# Patient Record
Sex: FEMALE | Race: OTHER | HISPANIC OR LATINO | URBAN - METROPOLITAN AREA
[De-identification: names, ages, dates, MRNs, and addresses within clinical notes are randomized per-mention and may not be internally consistent; named-entity substitution may affect disease eponyms.]

---

## 2019-05-24 ENCOUNTER — OUTPATIENT (OUTPATIENT)
Dept: OUTPATIENT SERVICES | Facility: HOSPITAL | Age: 39
LOS: 1 days | Discharge: HOME | End: 2019-05-24

## 2019-05-28 LAB — SURGICAL PATHOLOGY STUDY: SIGNIFICANT CHANGE UP

## 2020-07-29 ENCOUNTER — OUTPATIENT (OUTPATIENT)
Dept: OUTPATIENT SERVICES | Facility: HOSPITAL | Age: 40
LOS: 1 days | Discharge: HOME | End: 2020-07-29
Payer: COMMERCIAL

## 2020-07-29 PROCEDURE — 88305 TISSUE EXAM BY PATHOLOGIST: CPT | Mod: 26

## 2023-05-05 VITALS
HEIGHT: 60 IN | RESPIRATION RATE: 16 BRPM | TEMPERATURE: 97 F | OXYGEN SATURATION: 99 % | DIASTOLIC BLOOD PRESSURE: 82 MMHG | HEART RATE: 66 BPM | SYSTOLIC BLOOD PRESSURE: 119 MMHG | WEIGHT: 178.57 LBS

## 2023-05-08 ENCOUNTER — INPATIENT (INPATIENT)
Facility: HOSPITAL | Age: 43
LOS: 1 days | Discharge: HOME | DRG: 742 | End: 2023-05-10
Attending: OBSTETRICS & GYNECOLOGY | Admitting: OBSTETRICS & GYNECOLOGY
Payer: COMMERCIAL

## 2023-05-08 DIAGNOSIS — Z98.890 OTHER SPECIFIED POSTPROCEDURAL STATES: Chronic | ICD-10-CM

## 2023-05-08 DIAGNOSIS — Z98.51 TUBAL LIGATION STATUS: Chronic | ICD-10-CM

## 2023-05-08 DIAGNOSIS — J95.89 OTHER POSTPROCEDURAL COMPLICATIONS AND DISORDERS OF RESPIRATORY SYSTEM, NOT ELSEWHERE CLASSIFIED: ICD-10-CM

## 2023-05-08 LAB
BLD GP AB SCN SERPL QL: NEGATIVE — SIGNIFICANT CHANGE UP
GLUCOSE BLDC GLUCOMTR-MCNC: 88 MG/DL — SIGNIFICANT CHANGE UP (ref 70–99)
RH IG SCN BLD-IMP: POSITIVE — SIGNIFICANT CHANGE UP

## 2023-05-08 PROCEDURE — 88307 TISSUE EXAM BY PATHOLOGIST: CPT | Mod: 26

## 2023-05-08 PROCEDURE — 88305 TISSUE EXAM BY PATHOLOGIST: CPT | Mod: 26

## 2023-05-08 DEVICE — ARISTA 3GR
Type: IMPLANTABLE DEVICE | Site: BILATERAL | Status: NON-FUNCTIONAL
Removed: 2023-05-08

## 2023-05-08 RX ORDER — ONDANSETRON 8 MG/1
8 TABLET, FILM COATED ORAL EVERY 6 HOURS
Refills: 0 | Status: DISCONTINUED | OUTPATIENT
Start: 2023-05-08 | End: 2023-05-10

## 2023-05-08 RX ORDER — ACETAMINOPHEN 500 MG
1000 TABLET ORAL ONCE
Refills: 0 | Status: COMPLETED | OUTPATIENT
Start: 2023-05-08 | End: 2023-05-08

## 2023-05-08 RX ORDER — IBUPROFEN 200 MG
600 TABLET ORAL EVERY 6 HOURS
Refills: 0 | Status: DISCONTINUED | OUTPATIENT
Start: 2023-05-08 | End: 2023-05-10

## 2023-05-08 RX ORDER — HYDROMORPHONE HYDROCHLORIDE 2 MG/ML
0.5 INJECTION INTRAMUSCULAR; INTRAVENOUS; SUBCUTANEOUS
Refills: 0 | Status: DISCONTINUED | OUTPATIENT
Start: 2023-05-08 | End: 2023-05-10

## 2023-05-08 RX ORDER — GABAPENTIN 400 MG/1
300 CAPSULE ORAL ONCE
Refills: 0 | Status: COMPLETED | OUTPATIENT
Start: 2023-05-08 | End: 2023-05-08

## 2023-05-08 RX ORDER — ACETAMINOPHEN 500 MG
1000 TABLET ORAL EVERY 6 HOURS
Refills: 0 | Status: DISCONTINUED | OUTPATIENT
Start: 2023-05-08 | End: 2023-05-10

## 2023-05-08 RX ORDER — HEPARIN SODIUM 5000 [USP'U]/ML
5000 INJECTION INTRAVENOUS; SUBCUTANEOUS ONCE
Refills: 0 | Status: COMPLETED | OUTPATIENT
Start: 2023-05-08 | End: 2023-05-08

## 2023-05-08 RX ORDER — KETOROLAC TROMETHAMINE 30 MG/ML
30 SYRINGE (ML) INJECTION EVERY 6 HOURS
Refills: 0 | Status: DISCONTINUED | OUTPATIENT
Start: 2023-05-08 | End: 2023-05-09

## 2023-05-08 RX ORDER — ALBUTEROL 90 UG/1
2 AEROSOL, METERED ORAL EVERY 6 HOURS
Refills: 0 | Status: DISCONTINUED | OUTPATIENT
Start: 2023-05-08 | End: 2023-05-10

## 2023-05-08 RX ORDER — SIMETHICONE 80 MG/1
80 TABLET, CHEWABLE ORAL EVERY 6 HOURS
Refills: 0 | Status: DISCONTINUED | OUTPATIENT
Start: 2023-05-08 | End: 2023-05-10

## 2023-05-08 RX ORDER — OXYCODONE HYDROCHLORIDE 5 MG/1
5 TABLET ORAL EVERY 4 HOURS
Refills: 0 | Status: DISCONTINUED | OUTPATIENT
Start: 2023-05-08 | End: 2023-05-10

## 2023-05-08 RX ORDER — OXYCODONE HYDROCHLORIDE 5 MG/1
10 TABLET ORAL EVERY 6 HOURS
Refills: 0 | Status: DISCONTINUED | OUTPATIENT
Start: 2023-05-08 | End: 2023-05-10

## 2023-05-08 RX ORDER — CELECOXIB 200 MG/1
400 CAPSULE ORAL ONCE
Refills: 0 | Status: COMPLETED | OUTPATIENT
Start: 2023-05-08 | End: 2023-05-08

## 2023-05-08 RX ORDER — SODIUM CHLORIDE 9 MG/ML
1000 INJECTION, SOLUTION INTRAVENOUS
Refills: 0 | Status: DISCONTINUED | OUTPATIENT
Start: 2023-05-08 | End: 2023-05-09

## 2023-05-08 RX ADMIN — Medication 1000 MILLIGRAM(S): at 06:59

## 2023-05-08 RX ADMIN — CELECOXIB 400 MILLIGRAM(S): 200 CAPSULE ORAL at 07:17

## 2023-05-08 RX ADMIN — ALBUTEROL 2 PUFF(S): 90 AEROSOL, METERED ORAL at 13:21

## 2023-05-08 RX ADMIN — CELECOXIB 400 MILLIGRAM(S): 200 CAPSULE ORAL at 07:00

## 2023-05-08 RX ADMIN — Medication 1000 MILLIGRAM(S): at 17:40

## 2023-05-08 RX ADMIN — Medication 1000 MILLIGRAM(S): at 21:06

## 2023-05-08 RX ADMIN — Medication 30 MILLIGRAM(S): at 13:40

## 2023-05-08 RX ADMIN — Medication 30 MILLIGRAM(S): at 19:35

## 2023-05-08 RX ADMIN — HEPARIN SODIUM 5000 UNIT(S): 5000 INJECTION INTRAVENOUS; SUBCUTANEOUS at 07:00

## 2023-05-08 RX ADMIN — Medication 30 MILLIGRAM(S): at 13:20

## 2023-05-08 RX ADMIN — SIMETHICONE 80 MILLIGRAM(S): 80 TABLET, CHEWABLE ORAL at 19:04

## 2023-05-08 RX ADMIN — SIMETHICONE 80 MILLIGRAM(S): 80 TABLET, CHEWABLE ORAL at 13:20

## 2023-05-08 RX ADMIN — GABAPENTIN 300 MILLIGRAM(S): 400 CAPSULE ORAL at 07:00

## 2023-05-08 RX ADMIN — Medication 1000 MILLIGRAM(S): at 07:16

## 2023-05-08 RX ADMIN — Medication 1000 MILLIGRAM(S): at 22:06

## 2023-05-08 RX ADMIN — Medication 30 MILLIGRAM(S): at 19:04

## 2023-05-08 RX ADMIN — Medication 1000 MILLIGRAM(S): at 16:36

## 2023-05-08 NOTE — H&P ADULT - ASSESSMENT
43 year old G0 who presents for surgical management for pelvic pain and endometriosis.     NPO, IVF  ERAS protocol  Consented preop  Anesthesia preop  Starting Hb 13.5 , Cr 0.47    KL PGY4

## 2023-05-08 NOTE — BRIEF OPERATIVE NOTE - OPERATION/FINDINGS
Abdominal Chloraprep used. 7cm uterus on exam . Pfannenstiel entry. After fascial  layer, there was a densely adherent thick avascular layer that was fused to the rectus muscle. Small 7cm uterus; bilateral dilated fallopian tubes, normal ovaries. Careful lysis of adhesions. Supracervical hysterectomy, bilateral salpingectomy performed using ligasure impact, hemostasis achieved. Reverse cone performed. Cervical stump closed with 2.0 vicryl. Surgicel powder placed. Peritoneum closed. Fascia closed. Exparel injected. Subcutaneous tissue hemostatic and closed in 2 layers. Skin closed with monocryl. Count correct, patient cleaned and stable.

## 2023-05-08 NOTE — H&P ADULT - HISTORY OF PRESENT ILLNESS
DASHA WHYTE  43y  Female [id number]    HPI: Patient is a 43 year old G0 who presents for supracervical hysterectomy for pelvic pain and endometriosis. Reports no current issues.       Name of GYN Physician: Jonathon     POB:  G0     Pgyn: endometriosis; denies abnormal pap smears     PMH: denies    PSurgHx: abdominoplasty, tubal ligation     Meds: advil and celebrex     All: No Known Allergies    Soc: neg x 3    ROS: neg except as noted in HPI

## 2023-05-08 NOTE — BRIEF OPERATIVE NOTE - NSICDXBRIEFPROCEDURE_GEN_ALL_CORE_FT
PROCEDURES:  Hysterectomy, supracervical, abdominal, with bilateral salpingectomy 08-May-2023 10:47:27  Terry Conley

## 2023-05-08 NOTE — PRE-ANESTHESIA EVALUATION ADULT - NSANTHLABRESULTSFT_GEN_ALL_CORE
Outside chart/data reviewed prior to procedure/surgery  Please see paper chart  HCT 39.6    BUN 8  Cr 0.6

## 2023-05-09 LAB
HCT VFR BLD CALC: 32.6 % — LOW (ref 34.5–45)
HGB BLD-MCNC: 10.9 G/DL — LOW (ref 11.5–15.5)
MCHC RBC-ENTMCNC: 30.9 PG — SIGNIFICANT CHANGE UP (ref 27–34)
MCHC RBC-ENTMCNC: 33.4 GM/DL — SIGNIFICANT CHANGE UP (ref 32–36)
MCV RBC AUTO: 92.4 FL — SIGNIFICANT CHANGE UP (ref 80–100)
NRBC # BLD: 0 /100 WBCS — SIGNIFICANT CHANGE UP (ref 0–0)
PLATELET # BLD AUTO: 167 K/UL — SIGNIFICANT CHANGE UP (ref 150–400)
RBC # BLD: 3.53 M/UL — LOW (ref 3.8–5.2)
RBC # FLD: 12.7 % — SIGNIFICANT CHANGE UP (ref 10.3–14.5)
WBC # BLD: 8.69 K/UL — SIGNIFICANT CHANGE UP (ref 3.8–10.5)
WBC # FLD AUTO: 8.69 K/UL — SIGNIFICANT CHANGE UP (ref 3.8–10.5)

## 2023-05-09 RX ORDER — ACETAMINOPHEN 500 MG
2 TABLET ORAL
Qty: 0 | Refills: 0 | DISCHARGE
Start: 2023-05-09

## 2023-05-09 RX ORDER — IBUPROFEN 200 MG
1 TABLET ORAL
Qty: 0 | Refills: 0 | DISCHARGE
Start: 2023-05-09

## 2023-05-09 RX ORDER — ENOXAPARIN SODIUM 100 MG/ML
40 INJECTION SUBCUTANEOUS EVERY 24 HOURS
Refills: 0 | Status: DISCONTINUED | OUTPATIENT
Start: 2023-05-09 | End: 2023-05-10

## 2023-05-09 RX ADMIN — Medication 1000 MILLIGRAM(S): at 19:17

## 2023-05-09 RX ADMIN — Medication 1000 MILLIGRAM(S): at 18:10

## 2023-05-09 RX ADMIN — Medication 1000 MILLIGRAM(S): at 11:58

## 2023-05-09 RX ADMIN — SIMETHICONE 80 MILLIGRAM(S): 80 TABLET, CHEWABLE ORAL at 11:58

## 2023-05-09 RX ADMIN — Medication 1000 MILLIGRAM(S): at 06:08

## 2023-05-09 RX ADMIN — Medication 1000 MILLIGRAM(S): at 13:02

## 2023-05-09 RX ADMIN — ENOXAPARIN SODIUM 40 MILLIGRAM(S): 100 INJECTION SUBCUTANEOUS at 12:08

## 2023-05-09 RX ADMIN — Medication 30 MILLIGRAM(S): at 01:03

## 2023-05-09 RX ADMIN — SIMETHICONE 80 MILLIGRAM(S): 80 TABLET, CHEWABLE ORAL at 18:10

## 2023-05-09 RX ADMIN — Medication 30 MILLIGRAM(S): at 06:51

## 2023-05-09 RX ADMIN — Medication 1000 MILLIGRAM(S): at 07:08

## 2023-05-09 RX ADMIN — Medication 30 MILLIGRAM(S): at 07:51

## 2023-05-09 RX ADMIN — Medication 30 MILLIGRAM(S): at 00:03

## 2023-05-09 RX ADMIN — SIMETHICONE 80 MILLIGRAM(S): 80 TABLET, CHEWABLE ORAL at 06:08

## 2023-05-09 RX ADMIN — SIMETHICONE 80 MILLIGRAM(S): 80 TABLET, CHEWABLE ORAL at 00:03

## 2023-05-09 NOTE — DISCHARGE NOTE PROVIDER - NSDCMRMEDTOKEN_GEN_ALL_CORE_FT
acetaminophen 500 mg oral tablet: 2 tab(s) orally every 6 hours  ibuprofen 600 mg oral tablet: 1 tab(s) orally every 6 hours As needed Mild Pain (1 - 3)

## 2023-05-09 NOTE — DISCHARGE NOTE PROVIDER - CARE PROVIDER_API CALL
Karyn Holt)  Obstetrics and Gynecology  [address]  [place], [place] [address]  Phone: [phone]  Fax: [phone]  Follow Up Time:

## 2023-05-09 NOTE — DISCHARGE NOTE PROVIDER - HOSPITAL COURSE
42yo G0 s/p abdominal JAQUELINE, BS, JACQUELINE for pelvic pain, uterus 7cm. Uncomplicated hospital course, patient is stable and meeting all postOP milestones.   42yo G0 s/p abdominal JAQUELINE, BS, JACQUELINE for pelvic pain, uterus 7cm. Uncomplicated hospital course, patient is stable and meeting all post op milestones. Patient is hemodynamically stable for discharge.

## 2023-05-09 NOTE — DISCHARGE NOTE PROVIDER - NSDCCPTREATMENT_GEN_ALL_CORE_FT
PRINCIPAL PROCEDURE  Procedure: Hysterectomy, supracervical, abdominal, with bilateral salpingectomy  Findings and Treatment:

## 2023-05-09 NOTE — PROGRESS NOTE ADULT - ASSESSMENT
A/P: 43y POD#1 s/p abdominal supracervical hysterectomy, bilateral salpingectomy, JACQUELINE. Patient doing well postoperatively     Neuro: Pain well controlled, continue on PO pain medications  CV: hemodynamically stable, monitor vitals  Pulm: saturating well on room air, albuterol PRN for SOB, encourage IS.   GI: tolerating regular diet, SLIV  : remove villalpando, TOV by 15:00  Heme: AM Hb 10.9, lovenox started, SCDs as well  ID: afebrile  Dispo: continue routine post-op care    Terry Conley PGY4

## 2023-05-09 NOTE — DISCHARGE NOTE PROVIDER - NSDCCPCAREPLAN_GEN_ALL_CORE_FT
PRINCIPAL DISCHARGE DIAGNOSIS  Diagnosis: S/P abdominal supracervical subtotal hysterectomy  Assessment and Plan of Treatment:

## 2023-05-09 NOTE — DISCHARGE NOTE PROVIDER - NSDCFUADDINST_GEN_ALL_CORE_FT
- Nothing in vagina - no intercourse, tampons, or douching until cleared by your doctor.   - Avoid swimming, tub baths, and heavy lifting until cleared by your doctor.   - Showering is ok.   - Continue oral pain medications as needed for pain.  Take tylenol 1000 mg every 6 hours and Motrin every 6 hours. Alternate between them so every 3 hours you take a medication.   - Follow up in office in 1-2 weeks for your postoperative visit.    - Call the office sooner if you develop any fever, heavy bleeding, or severe pain.  Go to the closest emergency room for any of these symptoms if you are not able to contact your doctor.

## 2023-05-10 VITALS
RESPIRATION RATE: 17 BRPM | TEMPERATURE: 98 F | HEART RATE: 71 BPM | DIASTOLIC BLOOD PRESSURE: 65 MMHG | OXYGEN SATURATION: 98 % | SYSTOLIC BLOOD PRESSURE: 111 MMHG

## 2023-05-10 PROCEDURE — C1889: CPT

## 2023-05-10 PROCEDURE — 94640 AIRWAY INHALATION TREATMENT: CPT

## 2023-05-10 PROCEDURE — 86900 BLOOD TYPING SEROLOGIC ABO: CPT

## 2023-05-10 PROCEDURE — 86901 BLOOD TYPING SEROLOGIC RH(D): CPT

## 2023-05-10 PROCEDURE — 88305 TISSUE EXAM BY PATHOLOGIST: CPT

## 2023-05-10 PROCEDURE — 88307 TISSUE EXAM BY PATHOLOGIST: CPT

## 2023-05-10 PROCEDURE — 85027 COMPLETE CBC AUTOMATED: CPT

## 2023-05-10 PROCEDURE — 86850 RBC ANTIBODY SCREEN: CPT

## 2023-05-10 PROCEDURE — 82962 GLUCOSE BLOOD TEST: CPT

## 2023-05-10 PROCEDURE — 36415 COLL VENOUS BLD VENIPUNCTURE: CPT

## 2023-05-10 RX ORDER — IRON SUCROSE 20 MG/ML
200 INJECTION, SOLUTION INTRAVENOUS ONCE
Refills: 0 | Status: COMPLETED | OUTPATIENT
Start: 2023-05-10 | End: 2023-05-10

## 2023-05-10 RX ORDER — OXYCODONE HYDROCHLORIDE 5 MG/1
1 TABLET ORAL
Qty: 20 | Refills: 0
Start: 2023-05-10

## 2023-05-10 RX ADMIN — SIMETHICONE 80 MILLIGRAM(S): 80 TABLET, CHEWABLE ORAL at 05:42

## 2023-05-10 RX ADMIN — Medication 1000 MILLIGRAM(S): at 06:42

## 2023-05-10 RX ADMIN — Medication 1000 MILLIGRAM(S): at 05:42

## 2023-05-10 RX ADMIN — SIMETHICONE 80 MILLIGRAM(S): 80 TABLET, CHEWABLE ORAL at 00:06

## 2023-05-10 RX ADMIN — Medication 1000 MILLIGRAM(S): at 00:06

## 2023-05-10 RX ADMIN — OXYCODONE HYDROCHLORIDE 5 MILLIGRAM(S): 5 TABLET ORAL at 11:51

## 2023-05-10 RX ADMIN — Medication 1000 MILLIGRAM(S): at 01:06

## 2023-05-10 RX ADMIN — IRON SUCROSE 110 MILLIGRAM(S): 20 INJECTION, SOLUTION INTRAVENOUS at 10:59

## 2023-05-10 RX ADMIN — Medication 1 APPLICATION(S): at 10:52

## 2023-05-10 RX ADMIN — SIMETHICONE 80 MILLIGRAM(S): 80 TABLET, CHEWABLE ORAL at 10:52

## 2023-05-10 RX ADMIN — Medication 1000 MILLIGRAM(S): at 10:51

## 2023-05-10 RX ADMIN — OXYCODONE HYDROCHLORIDE 5 MILLIGRAM(S): 5 TABLET ORAL at 10:51

## 2023-05-10 RX ADMIN — ENOXAPARIN SODIUM 40 MILLIGRAM(S): 100 INJECTION SUBCUTANEOUS at 05:42

## 2023-05-10 NOTE — DISCHARGE NOTE NURSING/CASE MANAGEMENT/SOCIAL WORK - PATIENT PORTAL LINK FT
You can access the FollowMyHealth Patient Portal offered by [facility] by registering at the following website: [email or web address]. By joining [email or web address] FollowMyHealth portal, you will also be able to view your health information using other applications (apps) compatible with our system.

## 2023-05-10 NOTE — PROGRESS NOTE ADULT - ASSESSMENT
A/P: 43y POD#2 s/p abdominal supracervical hysterectomy, bilateral salpingectomy, lysis of adhesions doing well.       Neuro: Pain well controlled, continue on PO pain medications  CV: hemodynamically stable, monitor vitals  Pulm: saturating well on room air, encourage oob/amb  GI: tolerating regular diet, SLIV  : voiding spontaneously, urine output adequate  Heme: SCDs and lovenox for DVT PPx   ID: afebrile  Dispo: continue routine post-op care    Terry Conley PGY4

## 2023-05-10 NOTE — PROGRESS NOTE ADULT - SUBJECTIVE AND OBJECTIVE BOX
GYN Progress Note  POD#2    Patient seen and examined at bedside.  No acute events overnight. No acute complaints.  Pain well controlled.  Patient is ambulating and tolerating regular diet; passing flatus.   Patient is voiding spontaneously.   Denies CP, SOB, N/V, fevers, and chills, heavy vaginal bleeding .    Vital Signs Last 24 Hours  T(C): 36.9 (05-10-23 @ 05:11), Max: 37 (05-09-23 @ 06:01)  HR: 82 (05-10-23 @ 05:11) (82 - 97)  BP: 105/72 (05-10-23 @ 05:11) (101/68 - 134/89)  RR: 15 (05-10-23 @ 05:11) (15 - 18)  SpO2: 97% (05-10-23 @ 05:11) (92% - 98%)    I&O's Summary    08 May 2023 07:01  -  09 May 2023 07:00  --------------------------------------------------------  IN: 1120 mL / OUT: 2880 mL / NET: -1760 mL    09 May 2023 07:01  -  10 May 2023 05:50  --------------------------------------------------------  IN: 960 mL / OUT: 1400 mL / NET: -440 mL        Physical Exam:  General: NAD  CV: RR, S1, S2  Lungs: CTA b/l, good air flow b/l   Abdomen: Soft, appropriately tender to palpation, softly distended, present bowel sounds in all 4 quadrants   Incision: low transverse incision c/d/i, dressing removed   : Scant bleeding on pad  Ext: warm and well perfused    Labs:                        10.9   8.69  )-----------( 167      ( 09 May 2023 05:11 )             32.6   baso x      eos x      imm gran x      lymph x      mono x      poly x          MEDICATIONS  (STANDING):  acetaminophen     Tablet .. 1000 milliGRAM(s) Oral every 6 hours  enoxaparin Injectable 40 milliGRAM(s) SubCutaneous every 24 hours  simethicone 80 milliGRAM(s) Chew every 6 hours    MEDICATIONS  (PRN):  albuterol    90 MICROgram(s) HFA Inhaler 2 Puff(s) Inhalation every 6 hours PRN Bronchospasm  HYDROmorphone  Injectable 0.5 milliGRAM(s) IV Push every 15 minutes PRN Severe Pain (7 - 10)  ibuprofen  Tablet. 600 milliGRAM(s) Oral every 6 hours PRN Mild Pain (1 - 3)  ondansetron Injectable 8 milliGRAM(s) IV Push every 6 hours PRN Nausea and/or Vomiting  oxyCODONE    IR 5 milliGRAM(s) Oral every 4 hours PRN Moderate Pain (4 - 6)  oxyCODONE    IR 10 milliGRAM(s) Oral every 6 hours PRN Severe Pain (7 - 10)    
GYN POC    Pt seen and examined at bedside due to nursing report of chest tightness and shortness of breath. Pt reports that since leaving the PACU, she has had trouble taking deep breaths and feels it get worse when trying to speak or take deep breaths. Reports that the tightness is coming in waves.   Denies radiation of pain/tightness down her arm. Denies dizziness, palpitations, sharp abdominal/pelvic pain. Denies nausea/vomiting. Denies hx of asthma, or additional cardio/pulm issues.     T(F): 98 (05-08-23 @ 12:00), Max: 98 (05-08-23 @ 12:00)  HR: 103 (05-08-23 @ 12:00) (83 - 103)  BP: 125/79 (05-08-23 @ 12:00) (103/57 - 125/79)  RR: 19 (05-08-23 @ 12:00) (17 - 20)  SpO2: 97% (05-08-23 @ 12:00) (95% - 100%)  Wt(kg): --    05-08 @ 07:01  -  05-08 @ 12:47  --------------------------------------------------------  IN: 375 mL / OUT: 30 mL / NET: 345 mL      acetaminophen     Tablet .. 1000 milliGRAM(s) Oral every 6 hours  albuterol    90 MICROgram(s) HFA Inhaler 2 Puff(s) Inhalation every 6 hours PRN Bronchospasm  HYDROmorphone  Injectable 0.5 milliGRAM(s) IV Push every 15 minutes PRN Severe Pain (7 - 10)  ibuprofen  Tablet. 600 milliGRAM(s) Oral every 6 hours PRN Mild Pain (1 - 3)  ketorolac   Injectable 30 milliGRAM(s) IV Push every 6 hours  lactated ringers. 1000 milliLiter(s) IV Continuous <Continuous>  ondansetron Injectable 8 milliGRAM(s) IV Push every 6 hours PRN Nausea and/or Vomiting  oxyCODONE    IR 5 milliGRAM(s) Oral every 4 hours PRN Moderate Pain (4 - 6)  oxyCODONE    IR 10 milliGRAM(s) Oral every 6 hours PRN Severe Pain (7 - 10)  simethicone 80 milliGRAM(s) Chew every 6 hours      Physical exam:  Constitutional: Increased work of breathing noted while speaking  Pulmonary: clear to auscultation bilaterally.   Cardiovascular: regular rate and rhythm  Abdomen: incision site clean, dry and intact. Soft, mildly tender, nondistended  Extremities: no lower extremity edema, or calve tenderness. SCDs in place    A: 43 year old w/ hx of abdominoplasty (no additional medical hx) s/p abdominal JAQUELINE, BS, JACQUELINE for menorrhagia, uncomplicated. EBL 175ccs. Pt endorsing SOB w/ deep breaths and chest tightness while speaking/breathing deeply. O2% 91% on RA. 98% on 2L O2 on NC. . /79. Lungs clear on exam w/ increased WOB while speaking.     Plan: SOB w/ deep breaths and improvement in O2 saturation w/ O2 on NC in the setting of immediate post op state, consistent w/ bronchospasms.   Neuro: Toradol and Tylenol standing, Oxycodone prn, Dilaudid for breakthrough  Cardio: vital signs stable, continue to monitor per protocol. EKG due to SOB w/ deep breaths and increased work of breathing. Consider troponins if no improvement in SOB w/ bronchodilator  Pulm: Albuterol ordered for post op bronchospasms. incentive spirometer at least 10 times per hour while awake. Continue 2L NC until symptoms improve.   GI:   Reglan, Zofran prn, Simethicone, Protonix   : Couch catheter   Follow up labs   DVT ppx: SCDs   Activity: As tolerated  
GYN Progress Note  POD#1    Patient seen and examined at bedside.  No acute events overnight. No acute complaints.  Pain well controlled.  Patient has not ambulated yet; she is tolerating a regular diet.   Patient is passing flatus.    Couch is still in place with adequate urine output.   Denies CP, SOB, N/V, fevers, and chills.    Vital Signs Last 24 Hours  T(C): 37 (05-09-23 @ 06:01), Max: 37.5 (05-09-23 @ 00:08)  HR: 84 (05-09-23 @ 06:01) (83 - 103)  BP: 101/71 (05-09-23 @ 06:01) (99/67 - 125/79)  RR: 18 (05-09-23 @ 06:01) (15 - 20)  SpO2: 92% (05-09-23 @ 06:01) (92% - 100%)    I&O's Summary    08 May 2023 07:01  -  09 May 2023 06:48  --------------------------------------------------------  IN: 1120 mL / OUT: 2880 mL / NET: -1760 mL        Physical Exam:  General: NAD  CV: RR, S1, S2  Lungs: CTA b/l, good air flow b/l   Abdomen: Soft, appropriately tender to palpation, softly distended, present bowel sounds in all 4 quadrants   Incision: pressure dressing in place   : no bleeding on pad  Ext: warm and well perfused    Labs:                        10.9   8.69  )-----------( 167      ( 09 May 2023 05:11 )             32.6   baso x      eos x      imm gran x      lymph x      mono x      poly x          MEDICATIONS  (STANDING):  acetaminophen     Tablet .. 1000 milliGRAM(s) Oral every 6 hours  ketorolac   Injectable 30 milliGRAM(s) IV Push every 6 hours  simethicone 80 milliGRAM(s) Chew every 6 hours    MEDICATIONS  (PRN):  albuterol    90 MICROgram(s) HFA Inhaler 2 Puff(s) Inhalation every 6 hours PRN Bronchospasm  HYDROmorphone  Injectable 0.5 milliGRAM(s) IV Push every 15 minutes PRN Severe Pain (7 - 10)  ibuprofen  Tablet. 600 milliGRAM(s) Oral every 6 hours PRN Mild Pain (1 - 3)  ondansetron Injectable 8 milliGRAM(s) IV Push every 6 hours PRN Nausea and/or Vomiting  oxyCODONE    IR 5 milliGRAM(s) Oral every 4 hours PRN Moderate Pain (4 - 6)  oxyCODONE    IR 10 milliGRAM(s) Oral every 6 hours PRN Severe Pain (7 - 10)

## 2023-05-10 NOTE — DISCHARGE NOTE NURSING/CASE MANAGEMENT/SOCIAL WORK - NSDCPEFALRISK_GEN_ALL_CORE
For information on Fall & Injury Prevention, visit: [email or web address] OR  [email or web address] OR  https://www.cdc.gov/steadi/patient.html

## 2023-05-23 LAB — SURGICAL PATHOLOGY STUDY: SIGNIFICANT CHANGE UP

## 2023-05-24 DIAGNOSIS — N80.9 ENDOMETRIOSIS, UNSPECIFIED: ICD-10-CM

## 2023-05-24 DIAGNOSIS — J98.01 ACUTE BRONCHOSPASM: ICD-10-CM

## 2023-05-24 DIAGNOSIS — G43.909 MIGRAINE, UNSPECIFIED, NOT INTRACTABLE, WITHOUT STATUS MIGRAINOSUS: ICD-10-CM

## 2023-05-24 DIAGNOSIS — Z98.51 TUBAL LIGATION STATUS: ICD-10-CM

## 2023-05-24 DIAGNOSIS — M51.9 UNSPECIFIED THORACIC, THORACOLUMBAR AND LUMBOSACRAL INTERVERTEBRAL DISC DISORDER: ICD-10-CM

## 2023-05-24 DIAGNOSIS — K66.0 PERITONEAL ADHESIONS (POSTPROCEDURAL) (POSTINFECTION): ICD-10-CM

## 2024-10-29 NOTE — DISCHARGE NOTE NURSING/CASE MANAGEMENT/SOCIAL WORK - NSTRANSFERBELONGINGSDISPO_GEN_A_NUR
Last OV 09/11/2024  Next OV 04/25/2024  Last lab 04/25/2024  
Need follow up appt   
given to family

## (undated) DEVICE — PACK EXPLORATORY LAPAROTOMY

## (undated) DEVICE — GLV 7.5 PROTEXIS (WHITE)

## (undated) DEVICE — SUT VICRYL 1 36" CT-1 UNDYED

## (undated) DEVICE — SUT VICRYL 0 18" TIES

## (undated) DEVICE — SUT VICRYL PLUS 0 18" CT-1 UNDYED (POP-OFF)

## (undated) DEVICE — STAPLER SKIN PROXIMATE

## (undated) DEVICE — SUT VICRYL 0 54" TIES

## (undated) DEVICE — PACK GENERAL CLOSING

## (undated) DEVICE — FOLEY TRAY 16FR LF URINE METER SURESTEP

## (undated) DEVICE — SUT VICRYL 3-0 27" PS-1 UNDYED

## (undated) DEVICE — MARKING PEN W RULER

## (undated) DEVICE — LAP PAD 18 X 18"

## (undated) DEVICE — SUT VICRYL 0 36" CT-1 UNDYED

## (undated) DEVICE — LIGASURE IMPACT

## (undated) DEVICE — DRSG DERMABOND PRINEO 60CM

## (undated) DEVICE — ELCTR BOVIE TIP BLADE MEGADYNE E-Z CLEAN 4" EXTENDED

## (undated) DEVICE — SUT VICRYL 0 27" UR-6

## (undated) DEVICE — FOLEY TRAY 16FR 5CC LF UMETER CLOSED

## (undated) DEVICE — SUT VICRYL 0 18" CT-1 UNDYED (POP-OFF)

## (undated) DEVICE — POSITIONER FOAM EGG CRATE ULNAR 2PCS (PINK)

## (undated) DEVICE — PREP BETADINE SPONGE STICKS

## (undated) DEVICE — GLV 6.5 PROTEGRITY NEU-THERA

## (undated) DEVICE — GLV 7 PROTEXIS (WHITE)

## (undated) DEVICE — SUT MONOCRYL 3-0 18" PS-1

## (undated) DEVICE — TAPE SILK 3"

## (undated) DEVICE — ELCTR GROUNDING PAD ADULT COVIDIEN

## (undated) DEVICE — PREP CHLORAPREP HI-LITE ORANGE 26ML

## (undated) DEVICE — VENODYNE/SCD SLEEVE CALF MEDIUM

## (undated) DEVICE — WARMING BLANKET UPPER ADULT

## (undated) DEVICE — ELCTR BOVIE PENCIL SMOKE EVACUATION

## (undated) DEVICE — DRSG COMBINE 5X9"

## (undated) DEVICE — SUT VICRYL 2-0 27" SH